# Patient Record
Sex: FEMALE | Race: ASIAN | NOT HISPANIC OR LATINO
[De-identification: names, ages, dates, MRNs, and addresses within clinical notes are randomized per-mention and may not be internally consistent; named-entity substitution may affect disease eponyms.]

---

## 2024-05-24 PROBLEM — Z00.00 ENCOUNTER FOR PREVENTIVE HEALTH EXAMINATION: Status: ACTIVE | Noted: 2024-05-24

## 2024-05-30 ENCOUNTER — APPOINTMENT (OUTPATIENT)
Dept: GYNECOLOGIC ONCOLOGY | Facility: CLINIC | Age: 54
End: 2024-05-30
Payer: MEDICAID

## 2024-05-30 ENCOUNTER — LABORATORY RESULT (OUTPATIENT)
Age: 54
End: 2024-05-30

## 2024-05-30 VITALS
HEIGHT: 63 IN | DIASTOLIC BLOOD PRESSURE: 77 MMHG | TEMPERATURE: 97.5 F | WEIGHT: 136.6 LBS | RESPIRATION RATE: 16 BRPM | BODY MASS INDEX: 24.2 KG/M2 | OXYGEN SATURATION: 97 % | SYSTOLIC BLOOD PRESSURE: 112 MMHG | HEART RATE: 72 BPM

## 2024-05-30 DIAGNOSIS — L81.9 DISORDER OF PIGMENTATION, UNSPECIFIED: ICD-10-CM

## 2024-05-30 DIAGNOSIS — C53.9 MALIGNANT NEOPLASM OF CERVIX UTERI, UNSPECIFIED: ICD-10-CM

## 2024-05-30 DIAGNOSIS — R87.629 UNSPECIFIED ABNORMAL CYTOLOGICAL FINDINGS IN SPECIMENS FROM VAGINA: ICD-10-CM

## 2024-05-30 DIAGNOSIS — R73.03 PREDIABETES.: ICD-10-CM

## 2024-05-30 DIAGNOSIS — Z87.2 PERSONAL HISTORY OF DISEASES OF THE SKIN AND SUBCUTANEOUS TISSUE: ICD-10-CM

## 2024-05-30 DIAGNOSIS — Z78.9 OTHER SPECIFIED HEALTH STATUS: ICD-10-CM

## 2024-05-30 DIAGNOSIS — Z86.39 PERSONAL HISTORY OF OTHER ENDOCRINE, NUTRITIONAL AND METABOLIC DISEASE: ICD-10-CM

## 2024-05-30 DIAGNOSIS — Z87.19 PERSONAL HISTORY OF OTHER DISEASES OF THE DIGESTIVE SYSTEM: ICD-10-CM

## 2024-05-30 PROCEDURE — 99204 OFFICE O/P NEW MOD 45 MIN: CPT

## 2024-05-30 PROCEDURE — 99459 PELVIC EXAMINATION: CPT

## 2024-05-30 PROCEDURE — G2211 COMPLEX E/M VISIT ADD ON: CPT | Mod: NC

## 2024-05-30 NOTE — PHYSICAL EXAM
[Chaperone Present] : A chaperone was present in the examining room during all aspects of the physical examination [39187] : A chaperone was present during the pelvic exam. [Fully active, able to carry on all pre-disease performance without restriction] : Status 0 - Fully active, able to carry on all pre-disease performance without restriction [Normal] : Stool sample for occult blood: Negative [Absent] : CVAT: absent [FreeTextEntry2] : Sarah [de-identified] : no obvious skin disease/discoloration noted. other than the lower abdominal skin that the patient pointed out with discoloration nad hairloss etc.

## 2024-05-30 NOTE — REASON FOR VISIT
[FreeTextEntry1] : Presents for history of cellulitis  Patient known to me from [facility]

## 2024-05-30 NOTE — HISTORY OF PRESENT ILLNESS
[FreeTextEntry1] : Ms. Lyla Thomas is a 54 year old who is known to me from [facility], was diagnosed with cervical cancer, Stage 1B1 2/10/2016,  s/p  RA radical hysterectomy, BSO, pelvic lymphadenectomy 3/9/2016 . She completed EBRT brachytherapy with concurrent chemotherapy (4/6 cycles completed due to thrombocytopenia). Last visit with me was 2018.   Patient reports right side abdominal cellulitis and fever. She was treated with abx with resolution. She is concerned that cellulitis might be related to her surgical history. Reports chronic vaginal spotting after intercourse. Otherwise, no bleeding.  Also +weight gain of approximately 10 lbs within the year. She does not exercise. Denies n/v/d/c, abdominal bloating or distention. Complain about this in the vaginal area.  There was also concern of nodularity in the left side of the outer vagina. She has not been back to the office since year 2018  Healthcare Maintenance: - Mammogram: approx 2-years ago. Overdue. Scheduled for 2-weeks.  - Colonoscopy: <10 years ago. Does not remember when.  Cervical Staging Cervical Cancer - Clinical stage from 2/10/2016: FIGO Stage 1B1 (T1b1, N0, M0)  Oncology History: Cervical cancer 2/10/2016    Initial diagnosis                     cervical conization invasive squamous cll carcinoma moderately differentiated carcinoma, horizontal apread of 10mm already                        LVI present                     Cervical cancer squamous cell ca, Stage 1B1 3/9/2016      Surgery                     Radical hysterectomy via robotic approach, bilateral salpingectomy, pelvic lymphadenectomy                     Squamous cell carcinoma keratinizing type tumor was 0.5mm away from the paracolpos resection margin                     Cervical tumor measured 4.5cm horizontally on the final specimen,  with 15mm stromal invasion,  there was extensive                       lymphovascular invastion, 6/26 lymphnodes positive                     Radiation                     EBRT brachytherapy                    Chemotherapy - First line Treatment                    concurrent chemo 2/6 cycles not done due tolow plt                       Healthcare Maintenance: - Mammogram - Colonoscopy

## 2024-05-30 NOTE — REVIEW OF SYSTEMS
[Negative] : Musculoskeletal [Hematuria] : no hematuria [Dysuria] : no dysuria [Vaginal Discharge] : no vaginal discharge [Abn Vag Bleeding] : no abnormal vaginal bleeding

## 2024-05-30 NOTE — LETTER BODY
[Attached please find my note.] : Attached please find my note. [FreeTextEntry2] : Mane Oleary MD [address] [place] [address] Tel [phone] Fax: [phone]   Dear Dr Oleary   Ms Lyla Thomas was seen in my office for a consultation regarding her h/o cervical cancer.  Please see my consult note for her plan of care.  Thank you for allowing me to participate in the care of this patient.   Please do not hesitate to call if you have any questions.    Most Sincerely,       Eder Griffin MD [facility] Director, [facility] Professor of Obstetrics and Gynecology, [facility] [employer] [facility] Division of Gynecologic Oncology Department Obstetrics and Gynecology Tel [phone] [email or web address]

## 2024-05-31 LAB
HCT VFR BLD CALC: 37.5 %
HGB BLD-MCNC: 12.9 G/DL
HPV HIGH+LOW RISK DNA PNL CVX: DETECTED
MCHC RBC-ENTMCNC: 30.7 PG
MCHC RBC-ENTMCNC: 34.4 GM/DL
MCV RBC AUTO: 89.3 FL
PLATELET # BLD AUTO: 203 K/UL
RBC # BLD: 4.2 M/UL
RBC # FLD: 13.3 %
WBC # FLD AUTO: 5.25 K/UL

## 2024-06-01 LAB
ALBUMIN SERPL ELPH-MCNC: 4.7 G/DL
ALP BLD-CCNC: 106 U/L
ALT SERPL-CCNC: 154 U/L
ANION GAP SERPL CALC-SCNC: 16 MMOL/L
AST SERPL-CCNC: 74 U/L
BILIRUB SERPL-MCNC: 0.4 MG/DL
BUN SERPL-MCNC: 19 MG/DL
CALCIUM SERPL-MCNC: 9.7 MG/DL
CHLORIDE SERPL-SCNC: 101 MMOL/L
CO2 SERPL-SCNC: 22 MMOL/L
CREAT SERPL-MCNC: 0.73 MG/DL
EGFR: 98 ML/MIN/1.73M2
GLUCOSE SERPL-MCNC: 86 MG/DL
POTASSIUM SERPL-SCNC: 4.5 MMOL/L
PROT SERPL-MCNC: 7.4 G/DL
SODIUM SERPL-SCNC: 139 MMOL/L

## 2024-06-06 ENCOUNTER — NON-APPOINTMENT (OUTPATIENT)
Age: 54
End: 2024-06-06

## 2024-06-06 LAB — CYTOLOGY CVX/VAG DOC THIN PREP: ABNORMAL

## 2025-05-29 ENCOUNTER — APPOINTMENT (OUTPATIENT)
Dept: GYNECOLOGIC ONCOLOGY | Facility: CLINIC | Age: 55
End: 2025-05-29

## 2025-05-29 DIAGNOSIS — C53.9 MALIGNANT NEOPLASM OF CERVIX UTERI, UNSPECIFIED: ICD-10-CM
